# Patient Record
Sex: FEMALE | Race: WHITE | NOT HISPANIC OR LATINO | Employment: OTHER | URBAN - METROPOLITAN AREA
[De-identification: names, ages, dates, MRNs, and addresses within clinical notes are randomized per-mention and may not be internally consistent; named-entity substitution may affect disease eponyms.]

---

## 2017-03-23 ENCOUNTER — ALLSCRIPTS OFFICE VISIT (OUTPATIENT)
Dept: OTHER | Facility: OTHER | Age: 82
End: 2017-03-23

## 2017-05-15 ENCOUNTER — ALLSCRIPTS OFFICE VISIT (OUTPATIENT)
Dept: OTHER | Facility: OTHER | Age: 82
End: 2017-05-15

## 2017-08-14 ENCOUNTER — ALLSCRIPTS OFFICE VISIT (OUTPATIENT)
Dept: OTHER | Facility: OTHER | Age: 82
End: 2017-08-14

## 2017-08-14 PROCEDURE — 88305 TISSUE EXAM BY PATHOLOGIST: CPT | Performed by: OBSTETRICS & GYNECOLOGY

## 2017-08-14 PROCEDURE — 88344 IMHCHEM/IMCYTCHM EA MLT ANTB: CPT | Performed by: OBSTETRICS & GYNECOLOGY

## 2017-08-15 ENCOUNTER — LAB REQUISITION (OUTPATIENT)
Dept: LAB | Facility: HOSPITAL | Age: 82
End: 2017-08-15
Payer: MEDICARE

## 2017-08-15 DIAGNOSIS — N90.89 OTHER SPECIFIED NONINFLAMMATORY DISORDER OF VULVA AND PERINEUM: ICD-10-CM

## 2017-08-22 ENCOUNTER — GENERIC CONVERSION - ENCOUNTER (OUTPATIENT)
Dept: OTHER | Facility: OTHER | Age: 82
End: 2017-08-22

## 2017-08-29 ENCOUNTER — GENERIC CONVERSION - ENCOUNTER (OUTPATIENT)
Dept: OTHER | Facility: OTHER | Age: 82
End: 2017-08-29

## 2017-09-18 ENCOUNTER — ALLSCRIPTS OFFICE VISIT (OUTPATIENT)
Dept: OTHER | Facility: OTHER | Age: 82
End: 2017-09-18

## 2017-09-19 ENCOUNTER — GENERIC CONVERSION - ENCOUNTER (OUTPATIENT)
Dept: OTHER | Facility: OTHER | Age: 82
End: 2017-09-19

## 2017-09-22 RX ORDER — LEVOTHYROXINE SODIUM 0.05 MG/1
50 TABLET ORAL DAILY
COMMUNITY

## 2017-09-22 RX ORDER — DONEPEZIL HYDROCHLORIDE 5 MG/1
5 TABLET, FILM COATED ORAL
Status: ON HOLD | COMMUNITY
End: 2018-08-01 | Stop reason: ALTCHOICE

## 2017-09-22 RX ORDER — MEMANTINE HYDROCHLORIDE 10 MG/1
28 TABLET ORAL DAILY
Status: ON HOLD | COMMUNITY
End: 2018-08-01 | Stop reason: ALTCHOICE

## 2017-09-22 NOTE — PRE-PROCEDURE INSTRUCTIONS
Pre-Surgery Instructions:   Medication Instructions    donepezil (ARICEPT) 5 mg tablet Instructed patient per Anesthesia Guidelines   levothyroxine 50 mcg tablet Patient was instructed per "E-Preop"    memantine (NAMENDA) 10 mg tablet Patient was instructed per "E-Preop"      Medication Instruction (Hormone Replacement Therapy)    Please continue to take this medication on your normal schedule  If this is an oral medication and you take in the morning, you may do so with a sip of water  This medication may need to be discontinued four weeks before surgery if the surgical procedure is associated with a moderate to high risk of venous thromboembolism (e g  hip or knee replacement)  Please discuss with your surgeon to determine if this is required  Namenda XR 28 MG Oral Capsule Extended Release 24 Hour            NPO Instructions    Please do not eat or drink anything prior to your surgery as follows: For AM Surgery times = stop at midnight the night before  For PM Surgery times = Midnight to 8AM clear liquids only (Jello, broth, 7up, Sprite, apple juice, black coffee, black tea, Gatorade)  If you are supposed to take any of your medications, do so with a sip of water  Failure to follow these instructions can lead to an increased risk of lung complications and may result in a delay or cancellation of your procedure  If you have any questions, contact your institution for further instructions  Medical Procedure Risk        Medication Instruction (Thyroxine - Synthroid)    Please continue to take this medication on your normal schedule  If this is an oral medication and you take in the morning, you may do so with a sip of water           Levothyroxine Sodium 50 MCG Oral Tablet

## 2017-10-05 ENCOUNTER — ANESTHESIA EVENT (OUTPATIENT)
Dept: PERIOP | Facility: HOSPITAL | Age: 82
End: 2017-10-05
Payer: MEDICARE

## 2017-10-05 NOTE — ANESTHESIA PREPROCEDURE EVALUATION
Review of Systems/Medical History  Patient summary reviewed  Chart reviewed  History of anesthetic complications: No prior GAs  No family hx of anesthetic reactions  Cardiovascular  Exercise tolerance: good,  Hyperlipidemia,    Pulmonary  Smoker (Quit 2014) ex-smoker , COPD , ,        GI/Hepatic  Negative GI/hepatic ROS          Negative  ROS        Endo/Other  History of thyroid disease , hypothyroidism,      GYN      Comment: Vulvar neoplasm     Hematology   Musculoskeletal       Neurology      Comment: Severe dementia Psychology           Physical Exam    Airway    Mallampati score: II  TM Distance: >3 FB  Neck ROM: full     Dental   Comment: Upper partial plate,     Cardiovascular  Rhythm: regular, Rate: normal, Cardiovascular exam normal    Pulmonary  Pulmonary exam normal Breath sounds clear to auscultation,     Other Findings        Anesthesia Plan  ASA Score- 3       Anesthesia Type- IV sedation with anesthesia        Induction- intravenous      Informed Consent  Anesthetic plan and risks discussed with patient (Son)    NPO this AM   NKDA  Pt took her levothyroxine this AM     Plan: IV sedation/MAC, GA as backup    Risks and benefits discussed with pt and her son  Questions answered  Pt's son was consented due to pt's dementia

## 2017-10-06 ENCOUNTER — HOSPITAL ENCOUNTER (OUTPATIENT)
Facility: HOSPITAL | Age: 82
Setting detail: OUTPATIENT SURGERY
Discharge: HOME | End: 2017-10-06
Attending: OBSTETRICS & GYNECOLOGY | Admitting: OBSTETRICS & GYNECOLOGY
Payer: MEDICARE

## 2017-10-06 ENCOUNTER — ANESTHESIA (OUTPATIENT)
Dept: PERIOP | Facility: HOSPITAL | Age: 82
End: 2017-10-06
Payer: MEDICARE

## 2017-10-06 VITALS
TEMPERATURE: 96.9 F | SYSTOLIC BLOOD PRESSURE: 114 MMHG | RESPIRATION RATE: 161 BRPM | DIASTOLIC BLOOD PRESSURE: 60 MMHG | OXYGEN SATURATION: 94 % | BODY MASS INDEX: 24.19 KG/M2 | HEIGHT: 59 IN | WEIGHT: 120 LBS | HEART RATE: 72 BPM

## 2017-10-06 PROBLEM — D07.1 GRADE 3 VULVAR INTRAEPITHELIAL NEOPLASIA: Status: ACTIVE | Noted: 2017-10-06

## 2017-10-06 RX ORDER — ONDANSETRON 2 MG/ML
4 INJECTION INTRAMUSCULAR; INTRAVENOUS ONCE AS NEEDED
Status: DISCONTINUED | OUTPATIENT
Start: 2017-10-06 | End: 2017-10-06 | Stop reason: HOSPADM

## 2017-10-06 RX ORDER — DEXMEDETOMIDINE HYDROCHLORIDE 100 UG/ML
INJECTION, SOLUTION INTRAVENOUS AS NEEDED
Status: DISCONTINUED | OUTPATIENT
Start: 2017-10-06 | End: 2017-10-06 | Stop reason: SURG

## 2017-10-06 RX ORDER — EPHEDRINE SULFATE 50 MG/ML
INJECTION, SOLUTION INTRAVENOUS AS NEEDED
Status: DISCONTINUED | OUTPATIENT
Start: 2017-10-06 | End: 2017-10-06 | Stop reason: SURG

## 2017-10-06 RX ORDER — PROPOFOL 10 MG/ML
INJECTION, EMULSION INTRAVENOUS CONTINUOUS PRN
Status: DISCONTINUED | OUTPATIENT
Start: 2017-10-06 | End: 2017-10-06 | Stop reason: SURG

## 2017-10-06 RX ORDER — SODIUM CHLORIDE, SODIUM LACTATE, POTASSIUM CHLORIDE, CALCIUM CHLORIDE 600; 310; 30; 20 MG/100ML; MG/100ML; MG/100ML; MG/100ML
100 INJECTION, SOLUTION INTRAVENOUS CONTINUOUS
Status: DISCONTINUED | OUTPATIENT
Start: 2017-10-06 | End: 2017-10-06 | Stop reason: HOSPADM

## 2017-10-06 RX ORDER — ONDANSETRON 2 MG/ML
4 INJECTION INTRAMUSCULAR; INTRAVENOUS EVERY 6 HOURS PRN
Status: DISCONTINUED | OUTPATIENT
Start: 2017-10-06 | End: 2017-10-06 | Stop reason: HOSPADM

## 2017-10-06 RX ORDER — FENTANYL CITRATE 50 UG/ML
INJECTION, SOLUTION INTRAMUSCULAR; INTRAVENOUS AS NEEDED
Status: DISCONTINUED | OUTPATIENT
Start: 2017-10-06 | End: 2017-10-06 | Stop reason: SURG

## 2017-10-06 RX ORDER — OXYCODONE HYDROCHLORIDE AND ACETAMINOPHEN 5; 325 MG/1; MG/1
1 TABLET ORAL EVERY 4 HOURS PRN
Qty: 10 TABLET | Refills: 0 | Status: SHIPPED | OUTPATIENT
Start: 2017-10-06 | End: 2017-10-16

## 2017-10-06 RX ORDER — ACETAMINOPHEN 325 MG/1
650 TABLET ORAL EVERY 4 HOURS PRN
Status: DISCONTINUED | OUTPATIENT
Start: 2017-10-06 | End: 2017-10-06 | Stop reason: HOSPADM

## 2017-10-06 RX ORDER — ACETAMINOPHEN 325 MG/1
650 TABLET ORAL AS NEEDED
COMMUNITY
End: 2018-09-11

## 2017-10-06 RX ORDER — PROPOFOL 10 MG/ML
INJECTION, EMULSION INTRAVENOUS AS NEEDED
Status: DISCONTINUED | OUTPATIENT
Start: 2017-10-06 | End: 2017-10-06 | Stop reason: SURG

## 2017-10-06 RX ORDER — OXYCODONE HYDROCHLORIDE AND ACETAMINOPHEN 5; 325 MG/1; MG/1
1 TABLET ORAL EVERY 4 HOURS PRN
Status: DISCONTINUED | OUTPATIENT
Start: 2017-10-06 | End: 2017-10-06 | Stop reason: HOSPADM

## 2017-10-06 RX ORDER — FENTANYL CITRATE/PF 50 MCG/ML
25 SYRINGE (ML) INJECTION
Status: DISCONTINUED | OUTPATIENT
Start: 2017-10-06 | End: 2017-10-06 | Stop reason: HOSPADM

## 2017-10-06 RX ADMIN — DEXMEDETOMIDINE HYDROCHLORIDE 8 MCG: 100 INJECTION, SOLUTION INTRAVENOUS at 12:00

## 2017-10-06 RX ADMIN — EPHEDRINE SULFATE 10 MG: 50 INJECTION, SOLUTION INTRAMUSCULAR; INTRAVENOUS; SUBCUTANEOUS at 12:11

## 2017-10-06 RX ADMIN — PROPOFOL 100 MCG/KG/MIN: 10 INJECTION, EMULSION INTRAVENOUS at 12:00

## 2017-10-06 RX ADMIN — EPHEDRINE SULFATE 10 MG: 50 INJECTION, SOLUTION INTRAMUSCULAR; INTRAVENOUS; SUBCUTANEOUS at 12:22

## 2017-10-06 RX ADMIN — FENTANYL CITRATE 25 MCG: 50 INJECTION, SOLUTION INTRAMUSCULAR; INTRAVENOUS at 12:15

## 2017-10-06 RX ADMIN — PROPOFOL 80 MG: 10 INJECTION, EMULSION INTRAVENOUS at 12:00

## 2017-10-06 RX ADMIN — SODIUM CHLORIDE, SODIUM LACTATE, POTASSIUM CHLORIDE, AND CALCIUM CHLORIDE 100 ML/HR: .6; .31; .03; .02 INJECTION, SOLUTION INTRAVENOUS at 09:21

## 2017-10-06 RX ADMIN — CEFAZOLIN SODIUM 1000 MG: 1 SOLUTION INTRAVENOUS at 12:03

## 2017-10-06 RX ADMIN — FENTANYL CITRATE 25 MCG: 50 INJECTION, SOLUTION INTRAMUSCULAR; INTRAVENOUS at 12:07

## 2017-10-06 RX ADMIN — DEXMEDETOMIDINE HYDROCHLORIDE 4 MCG: 100 INJECTION, SOLUTION INTRAVENOUS at 12:15

## 2017-10-06 NOTE — ANESTHESIA POSTPROCEDURE EVALUATION
Post-Op Assessment Note      CV Status:  Stable    Mental Status:  Alert and lethargic    Hydration Status:  Euvolemic    PONV Controlled:  Controlled    Airway Patency:  Patent and adequate    Post Op Vitals Reviewed: Yes          Staff: CRNA       Comments: Airway reflexes intact  Oral airway removed in PACU    RN aware          /58    Temp 97 8 °F (36 6 °C) (10/06/17 1230)    Pulse  81   Resp 18 (10/06/17 1230)    SpO2 99 % (10/06/17 1230)

## 2017-10-06 NOTE — OP NOTE
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    Operative Findings:  10 mm x 10 mm left labia majora lesion     Complications:   None    Procedure and Technique:    Patient was taken to the operating room were a time out was performed to confirm correct patient and correct procedure  Intravenous sedation with anesthesia was administered and the patient was positioned on the operating room table in the dorsal lithotomy position  A demetrio hugger was placed to maintain control of core body temperature  The visualized lesion was demarcated with an ink pen  An Omni guide CO2 laser at 12 watt power setting was used to vaporize the lesion  Inspection of the vaporization site revealed no bleeding  Silvadene cream was applied to the vaporization site  The patient was awakened and moved to the recovery room bed and sent to the recovery room in satisfactory condition           Patient Disposition:  PACU     SIGNATURE: Dave Torres  DATE: 2017  TIME: 12:41 PM

## 2017-10-06 NOTE — DISCHARGE INSTRUCTIONS
Post-Gynecologic Surgery Discharge Instructions:  1  No heavy lifting more than one full gallon of milk (about 8 lbs) for 1 week  2  Nothing in the vagina for 6 weeks  3  You may take stairs one at a time, touching each step with both feet for the first few days, then as tolerated  4  Call the office for fever greater than 100  4'F, heavy vaginal bleeding, or increasing pain  5  Activity as tolerated  6  Avoid driving if taking narcotic pain medications (Percocet or Vicodin)  Post Operative Pain Management:  If you continue to have residual mild pain not entirely relieved then you may take 650 mg of tylenol every 6 hours  If you have any questions regarding your prescriptions please call your doctor

## 2017-10-19 ENCOUNTER — ALLSCRIPTS OFFICE VISIT (OUTPATIENT)
Dept: OTHER | Facility: OTHER | Age: 82
End: 2017-10-19

## 2017-10-20 NOTE — PROGRESS NOTES
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)    81year-old with history of BEE III     Plan  BEE III (vulvar intraepithelial neoplasia III)    · Follow-up PRN Evaluation and Treatment  Follow-up  Status: Complete  Done:  [id number]   Ordered; For: BEE III (vulvar intraepithelial neoplasia III); Ordered By: Rubina Mazariegos Performed:  Due: [id number]    The patient is doing well from a postoperative point of view  The patient can follow up on a p r n  basis  Chief Complaint  Patient has severe dementia and does not know why she is here  She is here for postop visit  Post-Op  Problem [facility]:   1) BEE IIIuterine prolapse requiring pessarysevere dementia  Previous Therapy:   1) 8/18/2017 vulvar biopsies showing BEE III10/6/2017: Omni guide CO2 laser vaporization of BEE III       Free Text HPI: 81year-old status post laser vaporization for BEE III  Patient comes in today for a postoperative visit with minimal complaints  Active Problems  1  Abnormality of gait due to impairment of balance (781 2) (R26 89)   2  Acute cognitive decline (799 52) (R41 841)   3  Adult hypothyroidism (244 9) (E03 9)   4  Age related osteoporosis (733 01) (M81 0)   5  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)   6  Chronic obstructive pulmonary disease (COPD) (496) (J44 9)   7  Complete uterovaginal prolapse (618 3) (N81 3)   8  Dementia without behavioral disturbance (294 20) (F03 90)   9  Hyperlipidemia (272 4) (E78 5)   10  Labial lesion (624 8) (N90 89)   11  Memory loss (780 93) (R41 3)   12  Osteopenia (733 90) (M85 80)   13  Other specified hypothyroidism (244 8) (E03 8)   14  Pain (780 96) (R52)   15  Pulmonary emphysema, unspecified emphysema type (492 8) (J43 9)   16  Symbolic dysfunction (079 09) (R48 9)   17  BEE III (vulvar intraepithelial neoplasia III) (233 32) (D07 1)   18   Vulvar lesion (624 8) (N90 89)    Social History   · Always uses seat belt   · Caffeine use (V49 89) (F15 90) · Never a smoker   · No alcohol use   · One child   ·     Current Meds   1  Donepezil HCl - 5 MG Oral Tablet; Therapy: 54YOC2967 to Recorded   2  Levothyroxine Sodium 50 MCG Oral Tablet; Therapy: 45UBA2799 to Recorded   3  Namenda XR 28 MG Oral Capsule Extended Release 24 Hour; Therapy: 08QMH0411 to Recorded   4  Tylenol 325 MG Oral Tablet; Therapy: 45XOH6708 to Recorded    Allergies  1  No Known Drug Allergies    Vitals   Recorded: 19Oct2017 11:01AM   Temperature 98 F   Heart Rate 64, L Radial   Pulse Quality Normal, L Radial   Respiration Quality Normal   Respiration 16   Systolic 275, LUE, Sitting   Diastolic 70, LUE, Sitting   Height 4 ft 11 in   Weight 115 lb    BMI Calculated 23 23   BSA Calculated 1 46     Physical Exam    Constitutional   General appearance: No acute distress, well appearing and well nourished  Genitourinary   External genitalia: Normal and no lesions appreciated  -- Area of laser vaporization is healing well  Chest   Breasts: Normal and no dimpling or skin changes noted  Abdomen   Abdomen: Normal, non-tender, and no organomegaly noted  Liver and spleen: No hepatomegaly or splenomegaly  Examination for hernias: No hernias appreciated         Future Appointments    Date/Time Provider Specialty Site   11/13/2017 09:45 AM Hakeem Motta DO Obstetrics/Gynecology [facility]   Electronically signed by : Fermin Mercedes MD; Oct 19 2017 11:08AM EST                       (Author)

## 2017-11-13 ENCOUNTER — ALLSCRIPTS OFFICE VISIT (OUTPATIENT)
Dept: OTHER | Facility: OTHER | Age: 82
End: 2017-11-13

## 2017-11-14 NOTE — PROGRESS NOTES
Assessment    1  Complete uterovaginal prolapse (618 3) (N81 3)    Discussion/Summary  Discussion Summary:   Uterine prolapse-removed 2 3/4 gelhorn and inserted a 2 3/4 shaatz for ease of maintenance  She was given the Gellhorn to take with her in case we need to use it in the future  in 3 months for pessary check  Goals and Barriers: The patient has the current Goals: See discussion  The patent has the current Barriers: Dementia  Patient's Capacity to Self-Care: Patient is unable to Self-Care:      Chief Complaint  Chief Complaint Free Text Note Form: PT HERE FOR PESSARY CK      History of Present Illness  HPI: Patient presents for pessary check  She is here with a caregiver from her nursing home  She has severe dementia  She has a Gellhorn pessary  She just had laser treatment for BEE 3  She has no complaints  Review of Systems  Focused-Female:  Constitutional: No fever, no chills, feels well, no tiredness, no recent weight gain or loss  ENT: no ear ache, no loss of hearing, no nosebleeds or nasal discharge, no sore throat or hoarseness  Cardiovascular: no complaints of slow or fast heart rate, no chest pain, no palpitations, no leg claudication or lower extremity edema  Respiratory: no complaints of shortness of breath, no wheezing, no dyspnea on exertion, no orthopnea or PND  Breasts: no complaints of breast pain, breast lump or nipple discharge  Gastrointestinal: no complaints of abdominal pain, no constipation, no nausea or diarrhea, no vomiting, no bloody stools  Genitourinary: no complaints of dysuria, no incontinence, no pelvic pain, no dysmenorrhea, no vaginal discharge or abnormal vaginal bleeding  Musculoskeletal: no complaints of arthralgia, no myalgia, no joint swelling or stiffness, no limb pain or swelling  Integumentary: no complaints of skin rash or lesion, no itching or dry skin, no skin wounds    Neurological: no complaints of headache, no confusion, no numbness or tingling, no dizziness or fainting  ROS Reviewed:   ROS reviewed  Active Problems  1  Abnormality of gait due to impairment of balance (781 2) (R26 89)   2  Acute cognitive decline (799 52) (R41 841)   3  Adult hypothyroidism (244 9) (E03 9)   4  Age related osteoporosis (733 01) (M81 0)   5  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)   6  Chronic obstructive pulmonary disease (COPD) (496) (J44 9)   7  Complete uterovaginal prolapse (618 3) (N81 3)   8  Dementia without behavioral disturbance (294 20) (F03 90)   9  Hyperlipidemia (272 4) (E78 5)   10  Memory loss (780 93) (R41 3)   11  Osteopenia (733 90) (M85 80)   12  Other specified hypothyroidism (244 8) (E03 8)   13  Pain (780 96) (R52)   14  Pulmonary emphysema, unspecified emphysema type (492 8) (J43 9)   15  Symbolic dysfunction (241 40) (R48 9)    Past Medical History  1  History of  1   2  History of Labial lesion (624 8) (N90 89)   3  History of Postmenopausal bleeding (627 1) (N95 0)   4  History of BEE III (vulvar intraepithelial neoplasia III) (233 32) (D07 1)   5  History of Vulvar lesion (624 8) (N90 89)  Active Problems And Past Medical History Reviewed: The active problems and past medical history were reviewed and updated today  Surgical History  1  Denied: History Of Prior Surgery  Surgical History Reviewed: The surgical history was reviewed and updated today  Family History  Mother    1  No pertinent family history  Father    2  No pertinent family history  Family History Reviewed: The family history was reviewed and updated today  Social History     · Always uses seat belt   · Caffeine use (V49 89) (F15 90)   · Never a smoker   · No alcohol use   · One child   ·   Social History Reviewed: The social history was reviewed and updated today  Current Meds   1  Donepezil HCl - 5 MG Oral Tablet; Therapy: 44YEQ3480 to Recorded   2  Levothyroxine Sodium 50 MCG Oral Tablet; Therapy: 46JSR7288 to Recorded   3   Namenda XR 28 MG Oral Capsule Extended Release 24 Hour; Therapy: 32QWT6486 to Recorded   4  Tylenol 325 MG Oral Tablet; Therapy: 18GLU1859 to Recorded  Medication List Reviewed: The medication list was reviewed and updated today  Allergies  1  No Known Drug Allergies    Vitals  Vital Signs    Recorded: 31HUF1195 55:59DI   Systolic 469, LUE, Sitting   Diastolic 74, LUE, Sitting   Height 4 ft 11 in   Weight 119 lb 6 oz   BMI Calculated 24 11   BSA Calculated 1 48   LMP POSTMENOPAUSAL       Physical Exam   Genitourinary  External genitalia: Normal and no lesions appreciated  Vagina: Normal, no lesions or dryness appreciated  -- Gellhorn pessary removed with minimal difficulty, proved very difficult to reinsert  A same size shaatz pessary, 2-3/4, was placed easily  There was no vaginal excoriation noted  Urethra: Normal    Urethral meatus: Normal    Bladder: Normal, soft, non-tender and no prolapse or masses appreciated  Cervix: Normal, no palpable masses  Uterus: Normal, non-tender, not enlarged, and no palpable masses  Adnexa/parametria: Normal, non-tender and no fullness or masses appreciated  Signatures   Electronically signed by :  Eneida Berger DO; Nov 13 2017 12:49PM EST                       (Author)

## 2018-01-09 NOTE — MISCELLANEOUS
Message   Recorded as Task   Date: 08/22/2017 02:15 PM, Created By: Jose Antonio Mustafa   Task Name: Follow Up   Assigned To: Irene Le   Regarding Patient: Diana Martinez, Status: Active   Comment:    Jose Antonio Mustafa - 22 Aug 2017 2:15 PM     TASK CREATED  I spoke with Winter Rivero's son  [place] had a vulvar bx last week and has BEE 3  She needs to see gyn-onc  She has dementia and lives at Son  Discussed different possibilities of treatment, she would probable be better with a least invasive treatment  I told him it would be good for him to go with her to the visit as there will be decisions to make and [place] is unable to make them  SHe will check with Son to see who we contact to set up a visit and then get back to us with the information  pleasae let me know what is finally set up and call me with any questions        Signatures   Electronically signed by :  Fredis Richardson DO; Aug 22 2017  2:16PM EST                       (Author)

## 2018-01-12 VITALS
HEIGHT: 60 IN | WEIGHT: 126.5 LBS | DIASTOLIC BLOOD PRESSURE: 84 MMHG | BODY MASS INDEX: 24.84 KG/M2 | SYSTOLIC BLOOD PRESSURE: 128 MMHG

## 2018-01-12 VITALS
TEMPERATURE: 97.8 F | HEIGHT: 59 IN | BODY MASS INDEX: 24.29 KG/M2 | DIASTOLIC BLOOD PRESSURE: 58 MMHG | SYSTOLIC BLOOD PRESSURE: 106 MMHG | WEIGHT: 120.5 LBS | RESPIRATION RATE: 14 BRPM | HEART RATE: 68 BPM

## 2018-01-12 VITALS
DIASTOLIC BLOOD PRESSURE: 74 MMHG | HEIGHT: 59 IN | WEIGHT: 119.38 LBS | SYSTOLIC BLOOD PRESSURE: 112 MMHG | BODY MASS INDEX: 24.07 KG/M2

## 2018-01-14 VITALS
BODY MASS INDEX: 23.58 KG/M2 | DIASTOLIC BLOOD PRESSURE: 84 MMHG | SYSTOLIC BLOOD PRESSURE: 118 MMHG | WEIGHT: 120.13 LBS | HEIGHT: 60 IN

## 2018-01-14 VITALS
WEIGHT: 115 LBS | RESPIRATION RATE: 16 BRPM | TEMPERATURE: 98 F | DIASTOLIC BLOOD PRESSURE: 70 MMHG | HEIGHT: 59 IN | SYSTOLIC BLOOD PRESSURE: 110 MMHG | HEART RATE: 64 BPM | BODY MASS INDEX: 23.18 KG/M2

## 2018-01-14 VITALS
BODY MASS INDEX: 24.84 KG/M2 | DIASTOLIC BLOOD PRESSURE: 78 MMHG | SYSTOLIC BLOOD PRESSURE: 124 MMHG | WEIGHT: 126.5 LBS | HEIGHT: 60 IN

## 2018-01-17 NOTE — MISCELLANEOUS
Message   Recorded as Task   Date: 08/22/2017 02:15 PM, Created By: Shaila Villasenor   Task Name: Follow Up   Assigned To: [facility]   Regarding Patient: Kerri Dobbins, Status: In Progress   Comment:    Shaila Villasenor - 22 Aug 2017 2:15 PM     TASK CREATED  I spoke with Shirlene March, Winter's son  [place] Pa had a vulvar bx last week and has BEE 3  She needs to see gyn-onc  She has dementia and lives at Son  Discussed different possibilities of treatment, she would probable be better with a least invasive treatment  I told him it would be good for him to go with her to the visit as there will be decisions to make and [place] Pa is unable to make them  SHe will check with Son to see who we contact to set up a visit and then get back to us with the information  josefina let me know what is finally set up and call me with any questions   Harman Bird - 22 Aug 2017 2:28 PM     TASK IN PROGRESS   Harman Bird - 24 Aug 2017 11:43 AM     TASK REPLIED TO: Previously Assigned To [address] [place] [address] with patient's son, Ryann Garcias  Informed him to expect a call from [facility]  Called [facility] to give patient information  They called patient's son and scheduled appointment on 9/18/17 @ 1300 with Dr Jasper Mojica, the new MD there  Ryann Garcias will accompany patient to the appointment  Shaila Villasenor - 28 Aug 2017 8:15 AM     TASK REPLIED TO: Previously Assigned To Shaila Villasenor     thank you        Active Problems    1  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)   2  Complete uterovaginal prolapse (618 3) (N81 3)   3  Labial lesion (624 8) (N90 89)   4  Memory loss (780 93) (R41 3)   5  Osteopenia (733 90) (M85 80)   6  Other specified hypothyroidism (244 8) (E03 8)   7  Pulmonary emphysema, unspecified emphysema type (492 8) (J43 9)   8  Vulvar lesion (624 8) (N90 89)    Current Meds   1  Donepezil HCl - 5 MG Oral Tablet; Therapy: 76WMO7207 to Recorded   2  Levothyroxine Sodium 50 MCG Oral Tablet;    Therapy: 70PTL5888 to Recorded   3  Milk of Magnesia CONC; Therapy: (Recorded:84Cxl0604) to Recorded   4  Namenda XR 28 MG Oral Capsule Extended Release 24 Hour; Therapy: 21RCG1738 to Recorded   5  Tylenol 325 MG Oral Tablet; Therapy: 97GFZ0711 to Recorded    Allergies    1   No Known Drug Allergies    Signatures   Electronically signed by : Andi Craft, ; Aug 29 2017  8:46AM EST                       (Author)

## 2018-01-18 NOTE — MISCELLANEOUS
Message   Date: 14 Jan 2016 12:48 PM EST, Recorded By: Anitha Ashby For: Mahendra Nicole   Reason: Medical Complaint   Fred from [facility] in  called for new Pt  Referred by PCP for uterine prolapse  Pt needs appt in  office  Pt will arrive by transport but is ambulatory          Signatures   Electronically signed by : Rhianna Davies, ; Jan 14 2016 12:51PM EST                       (Author)

## 2018-02-27 ENCOUNTER — OFFICE VISIT (OUTPATIENT)
Dept: OBGYN CLINIC | Facility: CLINIC | Age: 83
End: 2018-02-27
Payer: MEDICARE

## 2018-02-27 VITALS
WEIGHT: 118.8 LBS | SYSTOLIC BLOOD PRESSURE: 122 MMHG | BODY MASS INDEX: 22.43 KG/M2 | DIASTOLIC BLOOD PRESSURE: 70 MMHG | HEIGHT: 61 IN

## 2018-02-27 DIAGNOSIS — N81.4 UTEROVAGINAL PROLAPSE: Primary | ICD-10-CM

## 2018-02-27 PROCEDURE — 99213 OFFICE O/P EST LOW 20 MIN: CPT | Performed by: OBSTETRICS & GYNECOLOGY

## 2018-02-27 RX ORDER — ACETAMINOPHEN 325 MG/1
325 TABLET ORAL AS NEEDED
COMMUNITY
Start: 2016-02-19

## 2018-02-27 RX ORDER — MEMANTINE HYDROCHLORIDE 28 MG/1
28 CAPSULE, EXTENDED RELEASE ORAL DAILY
COMMUNITY
Start: 2016-02-19

## 2018-02-27 RX ORDER — DONEPEZIL HYDROCHLORIDE 5 MG/1
5 TABLET, FILM COATED ORAL DAILY
COMMUNITY
Start: 2016-02-19

## 2018-02-27 NOTE — PROGRESS NOTES
Assessment/Plan:     Utero vaginal prolapse-she will return in 3 months for a pessary check, at that time we will consider switching her to a 2-1/2 or 2-1/4 Shaatz pessary  There are no diagnoses linked to this encounter  Subjective:     Patient ID: Lionel Shin is a 81 y o  female  Patient presents with her caregiver for a pessary check  She has no complaints  She has severe dementia and is not a reliable historian  At her last visit we switched her from a Gellhorn to a Linda Sas because it was very uncomfortable removing the Gellhorn  She has a 2-3/4 shaatz pessary now  Review of Systems   All other systems reviewed and are negative  Objective:     Physical Exam   Genitourinary: Vagina normal and uterus normal    Genitourinary Comments: Shaatz removed, cleaned and reinserted with mild difficulty  Vagina is not excoriated from the pessary    Bimanual exam is normal with no adnexal masses and normal sized uterus

## 2018-05-22 ENCOUNTER — OFFICE VISIT (OUTPATIENT)
Dept: OBGYN CLINIC | Facility: CLINIC | Age: 83
End: 2018-05-22
Payer: MEDICARE

## 2018-05-22 VITALS
HEIGHT: 59 IN | WEIGHT: 115.8 LBS | SYSTOLIC BLOOD PRESSURE: 124 MMHG | BODY MASS INDEX: 23.35 KG/M2 | DIASTOLIC BLOOD PRESSURE: 76 MMHG

## 2018-05-22 DIAGNOSIS — N76.0 BACTERIAL VAGINOSIS: ICD-10-CM

## 2018-05-22 DIAGNOSIS — N81.4 UTERINE PROLAPSE: ICD-10-CM

## 2018-05-22 DIAGNOSIS — N90.89 VULVAR LESION: Primary | ICD-10-CM

## 2018-05-22 DIAGNOSIS — B96.89 BACTERIAL VAGINOSIS: ICD-10-CM

## 2018-05-22 DIAGNOSIS — N89.8 VAGINAL DISCHARGE: ICD-10-CM

## 2018-05-22 PROCEDURE — 87210 SMEAR WET MOUNT SALINE/INK: CPT | Performed by: OBSTETRICS & GYNECOLOGY

## 2018-05-22 PROCEDURE — 87186 SC STD MICRODIL/AGAR DIL: CPT | Performed by: OBSTETRICS & GYNECOLOGY

## 2018-05-22 PROCEDURE — 87205 SMEAR GRAM STAIN: CPT | Performed by: OBSTETRICS & GYNECOLOGY

## 2018-05-22 PROCEDURE — 87077 CULTURE AEROBIC IDENTIFY: CPT | Performed by: OBSTETRICS & GYNECOLOGY

## 2018-05-22 PROCEDURE — 87070 CULTURE OTHR SPECIMN AEROBIC: CPT | Performed by: OBSTETRICS & GYNECOLOGY

## 2018-05-22 PROCEDURE — 87147 CULTURE TYPE IMMUNOLOGIC: CPT | Performed by: OBSTETRICS & GYNECOLOGY

## 2018-05-22 PROCEDURE — 99213 OFFICE O/P EST LOW 20 MIN: CPT | Performed by: OBSTETRICS & GYNECOLOGY

## 2018-05-22 NOTE — PROGRESS NOTES
Assessment/Plan:     Bacterial vaginosis-prescription for metronidazole gel written on patient's order she for the nursing home  Left labial lesion-this was cultured, this is in the same area as her BEE 3 and laser treatment  Will await culture but she may need to follow up with gyn on for this  If necessary, we can repeat biopsy the area  It does not have a similar appearance to the BEE 3 lesion that she had  Uterine prolapse-stable, she is tolerating the pessary well, she will return in 3 months for a pessary check or sooner as needed     There are no diagnoses linked to this encounter  Subjective:     Patient ID: Edel Rosa is a 81 y o  female  Patient here with an aide for a pessary check  There is a note from the nursing home stating that the patient has had vaginal discharge and itching and a possible pustule on on her left labia  The patient has severe  dementia and is not aware of these symptoms and has no complaints  She is cooperative  Review of Systems   Genitourinary: Positive for vaginal discharge  Objective:     Physical Exam   Genitourinary: Vagina normal and uterus normal  There is lesion on the left labia  Uterus is not deviated, not enlarged, not fixed and not tender  Cervix exhibits discharge  Cervix exhibits no motion tenderness and no friability  Right adnexum displays no mass, no tenderness and no fullness  Left adnexum displays no mass, no tenderness and no fullness     Genitourinary Comments: shaatz removed, cleaned and reinserted without difficulty, no excoriation    Inner left labia at 4 o'clock reveals a few tiny white raised lesions, healing ulcerative area, culture taken

## 2018-05-24 LAB
BACTERIA WND AEROBE CULT: ABNORMAL
GRAM STN SPEC: ABNORMAL

## 2018-05-25 ENCOUNTER — TELEPHONE (OUTPATIENT)
Dept: OBGYN CLINIC | Facility: CLINIC | Age: 83
End: 2018-05-25

## 2018-05-25 NOTE — TELEPHONE ENCOUNTER
----- Message from Daly Simmons DO sent at 5/24/2018  3:42 PM EDT -----  Patient's left labial lesion culture grew out E coli and strep  This is susceptible to cephalosporins  Would treat with Keflex 500 mg b i d  for 1 week  If this resolves, I can recheck her at her pessary check in 3 months, if this does not resolve, she must be seen sooner  She does have a history of vulvar dysplasia that was treated with laser last year and I want to be sure this resolves  I did also diagnosed her with BV earlier this week and she can start the Keflex right away since it is p  o  and the Metrogel is vaginal   She lives in a nursing home and this information will need to be called there  We should have the information in her chart but if you have a problem, let me know    Thank you

## 2018-06-04 ENCOUNTER — DOCUMENTATION (OUTPATIENT)
Dept: OBGYN CLINIC | Facility: CLINIC | Age: 83
End: 2018-06-04

## 2018-06-04 NOTE — PROGRESS NOTES
I was called by a nurse at [facility] where patient resides  I saw Codie Castano 2 weeks ago and she had an ulcerative lesion on her labia along with 2 white lesions  The ulcerative lesion was cultured and grew out E coli  This was treated  This has resolved but the 2 white lesions are there and the patient is stating that they hurt her and she is continually touching them  She has severe dementia  Last year, the patient had severe vulvar dysplasia in this area that was treated but  when she was 1st diagnosed, it had the same appearance as the 2 white lesions  Since the infection seems to have resolved, I would recommend that she go back to see gyn Oncology, Dr Hortencia Sapp  The nurse will make an appointment for her

## 2018-06-06 ENCOUNTER — TELEPHONE (OUTPATIENT)
Dept: GYNECOLOGIC ONCOLOGY | Facility: CLINIC | Age: 83
End: 2018-06-06

## 2018-06-06 NOTE — TELEPHONE ENCOUNTER
[facility] cancelled [facility] appointment for tomorrow due to transportation issues  I made them aware Dr Hortencia Sapp will be away for 2 weeks  Asked if they wanted to see another doctor due to documented discomfort and they said no, 2 weeks is fine

## 2018-06-25 ENCOUNTER — OFFICE VISIT (OUTPATIENT)
Dept: GYNECOLOGIC ONCOLOGY | Facility: CLINIC | Age: 83
End: 2018-06-25
Payer: MEDICARE

## 2018-06-25 VITALS
RESPIRATION RATE: 14 BRPM | DIASTOLIC BLOOD PRESSURE: 62 MMHG | BODY MASS INDEX: 23.39 KG/M2 | TEMPERATURE: 98.3 F | WEIGHT: 116 LBS | HEART RATE: 70 BPM | SYSTOLIC BLOOD PRESSURE: 112 MMHG | HEIGHT: 59 IN

## 2018-06-25 DIAGNOSIS — D07.1 VIN III (VULVAR INTRAEPITHELIAL NEOPLASIA III): Primary | ICD-10-CM

## 2018-06-25 PROCEDURE — 88305 TISSUE EXAM BY PATHOLOGIST: CPT | Performed by: PATHOLOGY

## 2018-06-25 PROCEDURE — 56605 BIOPSY OF VULVA/PERINEUM: CPT | Performed by: OBSTETRICS & GYNECOLOGY

## 2018-06-25 PROCEDURE — 99214 OFFICE O/P EST MOD 30 MIN: CPT | Performed by: OBSTETRICS & GYNECOLOGY

## 2018-06-25 NOTE — PROGRESS NOTES
Pedro Buchanan  1935  [facility]  [facility] Alvah William  [address] [id number] [phone]    Chief Complaint   Patient presents with    Follow-up     History of BEE III      Previous Therapy: 10/6/2017:  Omni guide laser vaporization  of the vulva for BEE 3     History of Present Illness: The patient is a delightful 81year-old with a history of BEE 3   I last saw and treated the patient in October of 2017  Patient underwent an Omni guide laser vaporization of the left labia on October 6, 2017  Area was on the left labia and measured 10 x 10 mm  Patient recently saw Dr Rebecca Grant and had complaints of lesions in the same area  Patient comes in today for evaluation and treatment  Review of Systems   Constitutional: Negative for activity change, appetite change, chills, diaphoresis, fatigue, fever and unexpected weight change  HENT: Negative for congestion, dental problem, drooling, ear discharge, ear pain, facial swelling, hearing loss, mouth sores, nosebleeds, postnasal drip, rhinorrhea, sinus pain, sinus pressure, sneezing, sore throat, tinnitus, trouble swallowing and voice change  Eyes: Negative for photophobia, pain, discharge, redness, itching and visual disturbance  Respiratory: Negative for apnea, cough, choking, chest tightness, shortness of breath, wheezing and stridor  Cardiovascular: Negative for chest pain, palpitations and leg swelling  Gastrointestinal: Negative for abdominal distention, abdominal pain, anal bleeding, blood in stool, constipation, diarrhea, nausea, rectal pain and vomiting  Endocrine: Negative for cold intolerance, heat intolerance, polydipsia, polyphagia and polyuria     Genitourinary: Negative for decreased urine volume, difficulty urinating, dyspareunia, dysuria, enuresis, flank pain, frequency, genital sores, hematuria, menstrual problem, pelvic pain, urgency, vaginal bleeding, vaginal discharge and vaginal pain  Musculoskeletal: Negative for arthralgias, back pain, gait problem, joint swelling, myalgias, neck pain and neck stiffness  Skin: Negative for color change, pallor, rash and wound  Allergic/Immunologic: Negative for environmental allergies, food allergies and immunocompromised state  Neurological: Negative for dizziness, tremors, seizures, syncope, facial asymmetry, speech difficulty, weakness, light-headedness, numbness and headaches  Hematological: Negative for adenopathy  Does not bruise/bleed easily  Psychiatric/Behavioral: Negative for agitation, behavioral problems, confusion, decreased concentration, dysphoric mood, hallucinations, self-injury, sleep disturbance and suicidal ideas  The patient is not nervous/anxious and is not hyperactive  Patient Active Problem List   Diagnosis    BEE III (vulvar intraepithelial neoplasia III)     Social History     Social History    Marital status:      Spouse name: N/A    Number of children: 1    Years of education: N/A     Occupational History    Not on file       Social History Main Topics    Smoking status: Never Smoker    Smokeless tobacco: Never Used    Alcohol use No    Drug use: No    Sexual activity: Not on file     Other Topics Concern    Not on file     Social History Narrative    Always uses seat belt    Caffeine use         Past Medical History:   Diagnosis Date    Cognitive communication deficit     COPD (chronic obstructive pulmonary disease) ([facility] [address]     Dementia      1 para 1     Hyperlipidemia     Hypothyroidism     Labial lesion     Last assessed 17    Osteoporosis     Other abnormalities of gait and mobility     Postmenopausal bleeding     16    BEE III (vulvar intraepithelial neoplasia III)     Last assessed 10/19/17    Vulvar lesion     Resolved 17       Current Outpatient Prescriptions:     acetaminophen (TYLENOL) 325 mg tablet, Take 650 mg by mouth as needed for mild pain, Disp: , Rfl:     acetaminophen (TYLENOL) 325 mg tablet, Take 325 mg by mouth as needed, Disp: , Rfl:     donepezil (ARICEPT) 5 mg tablet, Take 5 mg by mouth daily at bedtime, Disp: , Rfl:     donepezil (ARICEPT) 5 mg tablet, Take 5 mg by mouth daily, Disp: , Rfl:     levothyroxine 50 mcg tablet, Take 50 mcg by mouth daily, Disp: , Rfl:     memantine (NAMENDA) 10 mg tablet, Take 28 mg by mouth daily  , Disp: , Rfl:     Memantine HCl ER (NAMENDA XR) 28 MG CP24, Take 28 mg by mouth daily, Disp: , Rfl:     OXYCODONE-ASPIRIN PO, Take 1 tablet by mouth every 6 (six) hours as needed, Disp: , Rfl:     silver sulfadiazine (SILVADENE,SSD) 1 % cream, Apply topically 2 (two) times a day Apply 1/16" once or twice daily; reapply as needed to areas where the cream is removed by patient activity; the burned area should be covered with cream at all times  Continue until healed  , Disp: 50 g, Rfl: 0  No Known Allergies  Vitals:    06/25/18 1531   BP: 112/62   Pulse: 70   Resp: 14   Temp: 98 3 °F (36 8 °C)       Labs:  CMP  No results found for: NA, K, CL, CO2, ANIONGAP, BUN, CREATININE, GLUCOSE, GLUF, CALCIUM, CORRECTEDCA, AST, ALT, ALKPHOS, PROT, ALBUMIN, BILITOT, EGFR    BMP  No results found for: GLUCOSE, CALCIUM, NA, K, CO2, CL, BUN, CREATININE    Lipids  No results found for: CHOL  No results found for: HDL  No results found for: LDLCALC  No results found for: TRIG  No components found for: CHOLHDL    Hemoglobin A1C  No results found for: HGBA1C    Fasting Glucose  No results found for: GLUF    Insulin     Thyroid  No results found for: TSH, H2OYRIY, B1WITGC, THYROIDAB    Hepatic Function Panel  No results found for: ALT, AST, GGT, ALKPHOS, BILITOT    Celiac Disease Antibody Panel  No results found for: ENDOMYSIAL IGA, GLIADIN IGA, GLIADIN IGG, IGA, TISSUE TRANSGLUT AB, TTG IGA   Iron  No results found for: IRON, TIBC, FERRITIN    Other Labs:         Physical Exam   Constitutional: She is oriented to person, place, and time  She appears well-developed and well-nourished  No distress  HENT:   Head: Normocephalic and atraumatic  Right Ear: External ear normal    Left Ear: External ear normal    Nose: Nose normal    Mouth/Throat: No oropharyngeal exudate  Eyes: Pupils are equal, round, and reactive to light  Right eye exhibits no discharge  Left eye exhibits no discharge  No scleral icterus  Neck: Normal range of motion  Neck supple  No JVD present  No tracheal deviation present  No thyromegaly present  Cardiovascular: Normal rate, regular rhythm, normal heart sounds and intact distal pulses  Exam reveals no gallop and no friction rub  No murmur heard  Pulmonary/Chest: Effort normal and breath sounds normal  No stridor  No respiratory distress  She has no wheezes  She has no rales  She exhibits no tenderness  Abdominal: Soft  Bowel sounds are normal  She exhibits no distension and no mass  There is no tenderness  There is no rebound and no guarding  Genitourinary: Vagina normal and uterus normal          Genitourinary Comments: The external female genitalia is normal  The bartholin's, uretheral and skenes glands are normal  The urethral meatus is normal  Speculum exam reveals a grossly normal vagina with age-appropriate atrophy  No masses, lesions or bleeding  Bimanual exam notes a normal uterus with no adnexal masses  Pessary in place  Musculoskeletal: Normal range of motion  She exhibits no edema, tenderness or deformity  Lymphadenopathy:     She has no cervical adenopathy  Neurological: She is alert and oriented to person, place, and time  She has normal reflexes  No cranial nerve deficit  She exhibits normal muscle tone  Coordination normal    Skin: Skin is warm and dry  No rash noted  She is not diaphoretic  No erythema  No pallor  Psychiatric: She has a normal mood and affect   Her behavior is normal  Judgment and thought content normal      Procedures   Vulvar biopsy performed with 3 mm punch biopsy  Patient tolerated the procedure well  1% lidocaine was used to numb the area prior to performing the biopsy  Silver nitrate was applied for hemostasis  Assessment  history of BEE 3 with vulvar biopsy performed today  Plan  patient will follow up in 2 weeks for results  Patient may need another laser vaporization if the results return as BEE 3     Blayne Rainey MD, PhD, Apolinar Barroso  Attending Physician, [address] [place] [address]

## 2018-07-17 ENCOUNTER — OFFICE VISIT (OUTPATIENT)
Dept: GYNECOLOGIC ONCOLOGY | Facility: CLINIC | Age: 83
End: 2018-07-17
Payer: MEDICARE

## 2018-07-17 ENCOUNTER — TELEPHONE (OUTPATIENT)
Dept: PREADMISSION TESTING | Facility: HOSPITAL | Age: 83
End: 2018-07-17

## 2018-07-17 VITALS
RESPIRATION RATE: 12 BRPM | DIASTOLIC BLOOD PRESSURE: 68 MMHG | HEIGHT: 59 IN | TEMPERATURE: 98.5 F | WEIGHT: 115.6 LBS | SYSTOLIC BLOOD PRESSURE: 120 MMHG | HEART RATE: 76 BPM | BODY MASS INDEX: 23.31 KG/M2

## 2018-07-17 DIAGNOSIS — D07.1 VIN III (VULVAR INTRAEPITHELIAL NEOPLASIA III): Primary | ICD-10-CM

## 2018-07-17 PROCEDURE — 99213 OFFICE O/P EST LOW 20 MIN: CPT | Performed by: OBSTETRICS & GYNECOLOGY

## 2018-07-17 NOTE — PROGRESS NOTES
Anibal Fleming  1935  [facility]  [facility] [address] [id number] [phone]    Chief Complaint   Patient presents with    Follow-up     Schedule surgery    Cancer Staging  No matching staging information was found for the patient  Previous Therapy:  CO2 laser vaporization 10/6/2017  History of Present Illness: The patient is a delightful 46-year-old with a history of BEE 3   I recently saw patient for suspicion of recurrence and I performed a biopsy of the left labia on 2008  Results from that biopsy showed recurrent BEE 3  Patient comes in today for results and surgical scheduling  Review of Systems    Patient Active Problem List   Diagnosis    BEE III (vulvar intraepithelial neoplasia III)     Social History     Social History    Marital status:      Spouse name: N/A    Number of children: 1    Years of education: N/A     Occupational History    Not on file       Social History Main Topics    Smoking status: Never Smoker    Smokeless tobacco: Never Used    Alcohol use No    Drug use: No    Sexual activity: Not on file     Other Topics Concern    Not on file     Social History Narrative    Always uses seat belt    Caffeine use         Past Medical History:   Diagnosis Date    Cognitive communication deficit     COPD (chronic obstructive pulmonary disease) ([facility] [address]     Dementia      1 para 1     Hyperlipidemia     Hypothyroidism     Labial lesion     Last assessed 17    Osteoporosis     Other abnormalities of gait and mobility     Postmenopausal bleeding     16    BEE III (vulvar intraepithelial neoplasia III)     Last assessed 10/19/17    Vulvar lesion     Resolved 17       Current Outpatient Prescriptions:     acetaminophen (TYLENOL) 325 mg tablet, Take 650 mg by mouth as needed for mild pain, Disp: , Rfl:     acetaminophen (TYLENOL) 325 mg tablet, Take 325 mg by mouth as needed, Disp: , Rfl:     donepezil (ARICEPT) 5 mg tablet, Take 5 mg by mouth daily at bedtime, Disp: , Rfl:     donepezil (ARICEPT) 5 mg tablet, Take 5 mg by mouth daily, Disp: , Rfl:     levothyroxine 50 mcg tablet, Take 50 mcg by mouth daily, Disp: , Rfl:     memantine (NAMENDA) 10 mg tablet, Take 28 mg by mouth daily  , Disp: , Rfl:     Memantine HCl ER (NAMENDA XR) 28 MG CP24, Take 28 mg by mouth daily, Disp: , Rfl:     OXYCODONE-ASPIRIN PO, Take 1 tablet by mouth every 6 (six) hours as needed, Disp: , Rfl:     silver sulfadiazine (SILVADENE,SSD) 1 % cream, Apply topically 2 (two) times a day Apply 1/16" once or twice daily; reapply as needed to areas where the cream is removed by patient activity; the burned area should be covered with cream at all times  Continue until healed  , Disp: 50 g, Rfl: 0  No Known Allergies  Vitals:    07/17/18 1014   BP: 120/68   Pulse: 76   Resp: 12   Temp: 98 5 °F (36 9 °C)       Labs:  CMP  No results found for: NA, K, CL, CO2, ANIONGAP, BUN, CREATININE, GLUCOSE, GLUF, CALCIUM, CORRECTEDCA, AST, ALT, ALKPHOS, PROT, ALBUMIN, BILITOT, EGFR    BMP  No results found for: GLUCOSE, CALCIUM, NA, K, CO2, CL, BUN, CREATININE    Lipids  No results found for: CHOL  No results found for: HDL  No results found for: LDLCALC  No results found for: TRIG  No components found for: CHOLHDL    Hemoglobin A1C  No results found for: HGBA1C    Fasting Glucose  No results found for: GLUF    Insulin     Thyroid  No results found for: TSH, B9OHEIG, B1LDDMK, THYROIDAB    Hepatic Function Panel  No results found for: ALT, AST, GGT, ALKPHOS, BILITOT    Celiac Disease Antibody Panel  No results found for: ENDOMYSIAL IGA, GLIADIN IGA, GLIADIN IGG, IGA, TISSUE TRANSGLUT AB, TTG IGA   Iron  No results found for: IRON, TIBC, FERRITIN    Other Labs:    6/25/2018: Final Diagnosis   A   Labia, left biopsy:  - High-grade squamous intraepithelial lesion/vulvar intraepithelial neoplasia grade 3 of usual type (HSIL/uVIN 3/carcinoma in-situ), present at peripheral borders/base of both biopsy fragments    - No invasive carcinoma identified in small portion of submucosa/dermis present  Physical Exam   Constitutional: She is oriented to person, place, and time  She appears well-developed and well-nourished  No distress  HENT:   Head: Normocephalic and atraumatic  Right Ear: External ear normal    Left Ear: External ear normal    Nose: Nose normal    Mouth/Throat: No oropharyngeal exudate  Eyes: Pupils are equal, round, and reactive to light  Right eye exhibits no discharge  Left eye exhibits no discharge  No scleral icterus  Neck: Normal range of motion  Neck supple  No JVD present  No tracheal deviation present  No thyromegaly present  Cardiovascular: Normal rate, regular rhythm, normal heart sounds and intact distal pulses  Exam reveals no gallop and no friction rub  No murmur heard  Pulmonary/Chest: Effort normal and breath sounds normal  No stridor  No respiratory distress  She has no wheezes  She has no rales  She exhibits no tenderness  Abdominal: Soft  Bowel sounds are normal  She exhibits no distension and no mass  There is no tenderness  There is no rebound and no guarding  Musculoskeletal: Normal range of motion  She exhibits no edema, tenderness or deformity  Lymphadenopathy:     She has no cervical adenopathy  Neurological: She is alert and oriented to person, place, and time  She has normal reflexes  No cranial nerve deficit  She exhibits normal muscle tone  Coordination normal    Skin: Skin is warm and dry  No rash noted  She is not diaphoretic  No erythema  No pallor  Psychiatric: She has a normal mood and affect  Her behavior is normal  Judgment and thought content normal        Assessment      Recurrent vulvar intraepithelial neoplasia 3    Plan      Proceed with CO2 laser vaporization of the vulva  Patient signed informed consent today    Patient understands the risks and wishes to proceed  Blayne Montanez MD, PhD, Tylor Booker  Attending Physician, [address] [facility]

## 2018-08-01 ENCOUNTER — HOSPITAL ENCOUNTER (OUTPATIENT)
Facility: HOSPITAL | Age: 83
Setting detail: OUTPATIENT SURGERY
Discharge: HOME | End: 2018-08-01
Attending: OBSTETRICS & GYNECOLOGY | Admitting: OBSTETRICS & GYNECOLOGY
Payer: MEDICARE

## 2018-08-01 ENCOUNTER — ANESTHESIA (OUTPATIENT)
Dept: PERIOP | Facility: HOSPITAL | Age: 83
End: 2018-08-01
Payer: MEDICARE

## 2018-08-01 ENCOUNTER — ANESTHESIA EVENT (OUTPATIENT)
Dept: PERIOP | Facility: HOSPITAL | Age: 83
End: 2018-08-01
Payer: MEDICARE

## 2018-08-01 VITALS
RESPIRATION RATE: 18 BRPM | HEIGHT: 61 IN | BODY MASS INDEX: 21.71 KG/M2 | HEART RATE: 73 BPM | TEMPERATURE: 96.7 F | OXYGEN SATURATION: 96 % | WEIGHT: 115 LBS | SYSTOLIC BLOOD PRESSURE: 124 MMHG | DIASTOLIC BLOOD PRESSURE: 69 MMHG

## 2018-08-01 DIAGNOSIS — D07.1 VIN III (VULVAR INTRAEPITHELIAL NEOPLASIA III): Primary | ICD-10-CM

## 2018-08-01 PROCEDURE — 56501 DESTROY VULVA LESIONS SIM: CPT | Performed by: OBSTETRICS & GYNECOLOGY

## 2018-08-01 RX ORDER — OXYCODONE HYDROCHLORIDE AND ACETAMINOPHEN 5; 325 MG/1; MG/1
1 TABLET ORAL EVERY 6 HOURS PRN
Status: DISCONTINUED | OUTPATIENT
Start: 2018-08-01 | End: 2018-08-01 | Stop reason: HOSPADM

## 2018-08-01 RX ORDER — ONDANSETRON 2 MG/ML
4 INJECTION INTRAMUSCULAR; INTRAVENOUS EVERY 6 HOURS PRN
Status: DISCONTINUED | OUTPATIENT
Start: 2018-08-01 | End: 2018-08-01 | Stop reason: HOSPADM

## 2018-08-01 RX ORDER — EPHEDRINE SULFATE 50 MG/ML
INJECTION, SOLUTION INTRAVENOUS AS NEEDED
Status: DISCONTINUED | OUTPATIENT
Start: 2018-08-01 | End: 2018-08-01 | Stop reason: SURG

## 2018-08-01 RX ORDER — DEXAMETHASONE SODIUM PHOSPHATE 4 MG/ML
INJECTION, SOLUTION INTRA-ARTICULAR; INTRALESIONAL; INTRAMUSCULAR; INTRAVENOUS; SOFT TISSUE AS NEEDED
Status: DISCONTINUED | OUTPATIENT
Start: 2018-08-01 | End: 2018-08-01 | Stop reason: SURG

## 2018-08-01 RX ORDER — FENTANYL CITRATE 50 UG/ML
INJECTION, SOLUTION INTRAMUSCULAR; INTRAVENOUS AS NEEDED
Status: DISCONTINUED | OUTPATIENT
Start: 2018-08-01 | End: 2018-08-01 | Stop reason: SURG

## 2018-08-01 RX ORDER — FENTANYL CITRATE/PF 50 MCG/ML
25 SYRINGE (ML) INJECTION
Status: DISCONTINUED | OUTPATIENT
Start: 2018-08-01 | End: 2018-08-01 | Stop reason: HOSPADM

## 2018-08-01 RX ORDER — ACETAMINOPHEN 325 MG/1
650 TABLET ORAL EVERY 6 HOURS PRN
Status: DISCONTINUED | OUTPATIENT
Start: 2018-08-01 | End: 2018-08-01 | Stop reason: HOSPADM

## 2018-08-01 RX ORDER — IBUPROFEN 400 MG/1
400 TABLET ORAL EVERY 6 HOURS PRN
Status: DISCONTINUED | OUTPATIENT
Start: 2018-08-01 | End: 2018-08-01 | Stop reason: HOSPADM

## 2018-08-01 RX ORDER — PROPOFOL 10 MG/ML
INJECTION, EMULSION INTRAVENOUS AS NEEDED
Status: DISCONTINUED | OUTPATIENT
Start: 2018-08-01 | End: 2018-08-01 | Stop reason: SURG

## 2018-08-01 RX ORDER — ONDANSETRON 2 MG/ML
4 INJECTION INTRAMUSCULAR; INTRAVENOUS ONCE AS NEEDED
Status: DISCONTINUED | OUTPATIENT
Start: 2018-08-01 | End: 2018-08-01 | Stop reason: HOSPADM

## 2018-08-01 RX ORDER — SODIUM CHLORIDE 9 MG/ML
100 INJECTION, SOLUTION INTRAVENOUS CONTINUOUS
Status: DISCONTINUED | OUTPATIENT
Start: 2018-08-01 | End: 2018-08-01 | Stop reason: HOSPADM

## 2018-08-01 RX ORDER — ONDANSETRON 2 MG/ML
INJECTION INTRAMUSCULAR; INTRAVENOUS AS NEEDED
Status: DISCONTINUED | OUTPATIENT
Start: 2018-08-01 | End: 2018-08-01 | Stop reason: SURG

## 2018-08-01 RX ORDER — SODIUM CHLORIDE, SODIUM LACTATE, POTASSIUM CHLORIDE, CALCIUM CHLORIDE 600; 310; 30; 20 MG/100ML; MG/100ML; MG/100ML; MG/100ML
20 INJECTION, SOLUTION INTRAVENOUS CONTINUOUS
Status: DISCONTINUED | OUTPATIENT
Start: 2018-08-01 | End: 2018-08-01 | Stop reason: HOSPADM

## 2018-08-01 RX ORDER — OXYCODONE HYDROCHLORIDE AND ACETAMINOPHEN 5; 325 MG/1; MG/1
1 TABLET ORAL EVERY 4 HOURS PRN
Qty: 20 TABLET | Refills: 0 | Status: SHIPPED | OUTPATIENT
Start: 2018-08-01 | End: 2018-08-11

## 2018-08-01 RX ADMIN — SODIUM CHLORIDE, SODIUM LACTATE, POTASSIUM CHLORIDE, AND CALCIUM CHLORIDE 20 ML/HR: .6; .31; .03; .02 INJECTION, SOLUTION INTRAVENOUS at 10:53

## 2018-08-01 RX ADMIN — FENTANYL CITRATE 50 MCG: 50 INJECTION, SOLUTION INTRAMUSCULAR; INTRAVENOUS at 12:32

## 2018-08-01 RX ADMIN — EPHEDRINE SULFATE 5 MG: 50 INJECTION, SOLUTION INTRAMUSCULAR; INTRAVENOUS; SUBCUTANEOUS at 12:48

## 2018-08-01 RX ADMIN — EPHEDRINE SULFATE 5 MG: 50 INJECTION, SOLUTION INTRAMUSCULAR; INTRAVENOUS; SUBCUTANEOUS at 12:33

## 2018-08-01 RX ADMIN — CEFAZOLIN SODIUM 1000 MG: 1 SOLUTION INTRAVENOUS at 12:34

## 2018-08-01 RX ADMIN — ONDANSETRON 4 MG: 2 INJECTION INTRAMUSCULAR; INTRAVENOUS at 12:36

## 2018-08-01 RX ADMIN — DEXAMETHASONE SODIUM PHOSPHATE 4 MG: 4 INJECTION, SOLUTION INTRAMUSCULAR; INTRAVENOUS at 12:36

## 2018-08-01 RX ADMIN — EPHEDRINE SULFATE 10 MG: 50 INJECTION, SOLUTION INTRAMUSCULAR; INTRAVENOUS; SUBCUTANEOUS at 12:38

## 2018-08-01 RX ADMIN — PROPOFOL 150 MG: 10 INJECTION, EMULSION INTRAVENOUS at 12:32

## 2018-08-01 NOTE — H&P (VIEW-ONLY)
Jillian Lua  1935  [facility]  [facility] [place] Tony  [address] [address] [phone]    Chief Complaint   Patient presents with    Follow-up     Schedule surgery    Cancer Staging  No matching staging information was found for the patient  Previous Therapy:  CO2 laser vaporization 10/6/2017  History of Present Illness: The patient is a delightful 81-year-old with a history of BEE 3   I recently saw patient for suspicion of recurrence and I performed a biopsy of the left labia on 2008  Results from that biopsy showed recurrent BEE 3  Patient comes in today for results and surgical scheduling  Review of Systems    Patient Active Problem List   Diagnosis    BEE III (vulvar intraepithelial neoplasia III)     Social History     Social History    Marital status:      Spouse name: N/A    Number of children: 1    Years of education: N/A     Occupational History    Not on file       Social History Main Topics    Smoking status: Never Smoker    Smokeless tobacco: Never Used    Alcohol use No    Drug use: No    Sexual activity: Not on file     Other Topics Concern    Not on file     Social History Narrative    Always uses seat belt    Caffeine use         Past Medical History:   Diagnosis Date    Cognitive communication deficit     COPD (chronic obstructive pulmonary disease) ([facility] [address]     Dementia      1 para 1     Hyperlipidemia     Hypothyroidism     Labial lesion     Last assessed 17    Osteoporosis     Other abnormalities of gait and mobility     Postmenopausal bleeding     16    BEE III (vulvar intraepithelial neoplasia III)     Last assessed 10/19/17    Vulvar lesion     Resolved 17       Current Outpatient Prescriptions:     acetaminophen (TYLENOL) 325 mg tablet, Take 650 mg by mouth as needed for mild pain, Disp: , Rfl:     acetaminophen (TYLENOL) 325 mg tablet, Take 325 mg by mouth as needed, Disp: , Rfl:     donepezil (ARICEPT) 5 mg tablet, Take 5 mg by mouth daily at bedtime, Disp: , Rfl:     donepezil (ARICEPT) 5 mg tablet, Take 5 mg by mouth daily, Disp: , Rfl:     levothyroxine 50 mcg tablet, Take 50 mcg by mouth daily, Disp: , Rfl:     memantine (NAMENDA) 10 mg tablet, Take 28 mg by mouth daily  , Disp: , Rfl:     Memantine HCl ER (NAMENDA XR) 28 MG CP24, Take 28 mg by mouth daily, Disp: , Rfl:     OXYCODONE-ASPIRIN PO, Take 1 tablet by mouth every 6 (six) hours as needed, Disp: , Rfl:     silver sulfadiazine (SILVADENE,SSD) 1 % cream, Apply topically 2 (two) times a day Apply 1/16" once or twice daily; reapply as needed to areas where the cream is removed by patient activity; the burned area should be covered with cream at all times  Continue until healed  , Disp: 50 g, Rfl: 0  No Known Allergies  Vitals:    07/17/18 1014   BP: 120/68   Pulse: 76   Resp: 12   Temp: 98 5 °F (36 9 °C)       Labs:  CMP  No results found for: NA, K, CL, CO2, ANIONGAP, BUN, CREATININE, GLUCOSE, GLUF, CALCIUM, CORRECTEDCA, AST, ALT, ALKPHOS, PROT, ALBUMIN, BILITOT, EGFR    BMP  No results found for: GLUCOSE, CALCIUM, NA, K, CO2, CL, BUN, CREATININE    Lipids  No results found for: CHOL  No results found for: HDL  No results found for: LDLCALC  No results found for: TRIG  No components found for: CHOLHDL    Hemoglobin A1C  No results found for: HGBA1C    Fasting Glucose  No results found for: GLUF    Insulin     Thyroid  No results found for: TSH, [id number], [id number], THYROIDAB    Hepatic Function Panel  No results found for: ALT, AST, GGT, ALKPHOS, BILITOT    Celiac Disease Antibody Panel  No results found for: ENDOMYSIAL IGA, GLIADIN IGA, GLIADIN IGG, IGA, TISSUE TRANSGLUT AB, TTG IGA   Iron  No results found for: IRON, TIBC, FERRITIN    Other Labs:    6/25/2018: Final Diagnosis   A   Labia, left biopsy:  - High-grade squamous intraepithelial lesion/vulvar intraepithelial neoplasia grade 3 of usual type (HSIL/uVIN 3/carcinoma in-situ), present at peripheral borders/base of both biopsy fragments    - No invasive carcinoma identified in small portion of submucosa/dermis present  Physical Exam   Constitutional: She is oriented to person, place, and time  She appears well-developed and well-nourished  No distress  HENT:   Head: Normocephalic and atraumatic  Right Ear: External ear normal    Left Ear: External ear normal    Nose: Nose normal    Mouth/Throat: No oropharyngeal exudate  Eyes: Pupils are equal, round, and reactive to light  Right eye exhibits no discharge  Left eye exhibits no discharge  No scleral icterus  Neck: Normal range of motion  Neck supple  No JVD present  No tracheal deviation present  No thyromegaly present  Cardiovascular: Normal rate, regular rhythm, normal heart sounds and intact distal pulses  Exam reveals no gallop and no friction rub  No murmur heard  Pulmonary/Chest: Effort normal and breath sounds normal  No stridor  No respiratory distress  She has no wheezes  She has no rales  She exhibits no tenderness  Abdominal: Soft  Bowel sounds are normal  She exhibits no distension and no mass  There is no tenderness  There is no rebound and no guarding  Musculoskeletal: Normal range of motion  She exhibits no edema, tenderness or deformity  Lymphadenopathy:     She has no cervical adenopathy  Neurological: She is alert and oriented to person, place, and time  She has normal reflexes  No cranial nerve deficit  She exhibits normal muscle tone  Coordination normal    Skin: Skin is warm and dry  No rash noted  She is not diaphoretic  No erythema  No pallor  Psychiatric: She has a normal mood and affect  Her behavior is normal  Judgment and thought content normal        Assessment      Recurrent vulvar intraepithelial neoplasia 3    Plan      Proceed with CO2 laser vaporization of the vulva  Patient signed informed consent today    Patient understands the risks and wishes to proceed  Blayne Yu MD, PhD, Martina Singh  Attending Physician, [address] [place] [address]

## 2018-08-01 NOTE — DISCHARGE INSTRUCTIONS
Laser ablation of Skin Lesion   AMBULATORY CARE:   What you need to know about laser ablation of a skin lesion:  Ablation of a skin lesion is surgery to surgically burn a piece of abnormal skin tissue  The skin tissue may be malignant (skin cancer) or it may be benign  Benign means the skin tissue does not have cancer cells and cannot spread  How to prepare for ablation of a skin lesion:  Your healthcare provider will talk to you about how to prepare for surgery  He may tell you not to eat or drink anything after midnight on the day of your surgery  He will tell you what medicines to take or not take on the day of your surgery  You may be given an antibiotic through your IV to help prevent a bacterial infection  What will happen during ablation of a skin lesion:  Your healthcare provider will ramón the area of your skin that will be removed  He will then ablate on the marked area  He will surgically burn the outer layer of your skin  He may also remove deeper layers of tissue underneath your skin  He may use heat to stop any bleeding  He may cover your ablation site with a silvadene  What will happen after ablation of a skin lesion:  Apply silvadine cream 1 - 2 times daily as needed  Risks of excision of a skin lesion:  You may bleed more than expected or get an infection  You may lose feeling, or you may feel tingling or prickling in the surgery area  Your scar may not look the way you expected  It may also limit your movement or affect your expressions if you had surgery on your face  Contact your healthcare provider if:   · You have pain in your ablation site that does not get better with medicine  · You have a fever or chills  · Your wound is red, swollen, bleeding, or draining pus  · You have questions or concerns about your condition or care  Care for your wound as directed: You may take a shower 24 hours after  your surgery   Do not  soak in water (bathtub, hot tub, swimming pool) until 2 weeks following surgery  Check your wound for signs of infection such as redness, swelling, or pus drainage  You may shower and allow warm soapy water to run over surgery site, do not scrub directly  Apply silvadene cream  One to Two times daily as needed      Follow up with your healthcare provider as directed:   Write down your questions so you remember to ask them during your visits  © 2017 [employer] Information is for End User's use only and may not be sold, redistributed or otherwise used for commercial purposes  All illustrations and images included in CareNotes® are the copyrighted property of A D [employer] M , [employer]  or Rashawn Mcgowan  The above information is an  only  It is not intended as medical advice for individual conditions or treatments  Talk to your doctor, nurse or pharmacist before following any medical regimen to see if it is safe and effective for you  HPV (Human Papillomavirus)   WHAT YOU NEED TO KNOW:   What is human papillomavirus (HPV)? HPV is the most common infection spread by sexual contact  It can also be spread from a mother to her baby during delivery  HPV may cause oral and genital warts or tumors in your nose, mouth, throat, and lungs  HPV may also cause vaginal, penile, and anal cancers  You may not show symptoms of any of these conditions for several years after being exposed to HPV  What are the symptoms of HPV? · Painless warts    · Genital or anal discharge, bleeding, itching, or pain    · Pain when you urinate  How is HPV diagnosed and treated? Your healthcare provider may use a vinegar liquid to help diagnose HPV genital warts  He or she may take tissue samples to test for HPV infection  There is no cure for HPV  There is treatment for the conditions that are caused by HPV  You will need to be closely monitored for these conditions   Ask your healthcare provider for more information about monitoring, conditions caused by HPV, and treatments  How can HPV infection be prevented? Vaccinations can help stop the spread of HPV  The vaccine is most effective if given before sexual activity begins  This allows your body to build almost complete protection against HPV before having contact with the virus  The HPV vaccine is still effective up to the age of 32 if it is given after sexual activity has already begun  Who should get the HPV vaccine? The first dose of the vaccine may be given as early as 5years of age  The following should also get the vaccine:  · All females and males 6to 15years of age    · Females 15 through 32years of age, and males 15 through 24years of age, who have not been vaccinated     · Men up to 32years of age who have sex with other men, and have not been vaccinated     · Anyone with a weak immune system, including infection with HIV, up to 32years of age  Who should not get the vaccine or should wait to get it? Do not get a second dose if you had a severe allergic reaction to the first dose  Do not get the vaccine if you are pregnant  If you are sick, wait to get the vaccine until symptoms go away  How is the vaccine given? The HPV vaccine can be given with other vaccinations  The vaccine is given in 3 doses to adults:  · The first dose  is given at any time  · The second dose  is given 1 to 2 months after the first dose  · The third dose  is given 6 months after the first dose  What else do I need to know about how the vaccine is given? You may need 1 more dose of the HPV vaccine if any of the following is true:  · You only received 1 dose of the HPV vaccine before 13years of age    · You received 2 doses of the HPV vaccine less than 5 months apart before 13years of age  When should I contact my healthcare provider? · You have warts in your genital or anal area  · You have genital or anal discharge, bleeding, itching, or pain  · You have pain when you urinate      · You have questions or concerns about your condition or care  CARE AGREEMENT:   You have the right to help plan your care  Learn about your health condition and how it may be treated  Discuss treatment options with your caregivers to decide what care you want to receive  You always have the right to refuse treatment  The above information is an  only  It is not intended as medical advice for individual conditions or treatments  Talk to your doctor, nurse or pharmacist before following any medical regimen to see if it is safe and effective for you  © 2017 [employer] Information is for End User's use only and may not be sold, redistributed or otherwise used for commercial purposes  All illustrations and images included in CareNotes® are the copyrighted property of [employer]  or Rashawn Mcgowan

## 2018-08-01 NOTE — ANESTHESIA PREPROCEDURE EVALUATION
Review of Systems/Medical History  Patient summary reviewed  Chart reviewed  No history of anesthetic complications     Cardiovascular  EKG reviewed, Exercise tolerance (METS): >4,  Hyperlipidemia,   Comment: clearance reviewed  EKG: NSR  Patient lives in nursing home  Does walk around and climb stairs  No positive h/o of any chest pain, SOB, MI in past   ,  Pulmonary  COPD ,        GI/Hepatic            Endo/Other  History of thyroid disease , hypothyroidism,      GYN      Comment: BEE III (vulvar intraepithelial neoplasia III)     Hematology   Musculoskeletal       Neurology   Psychology           Physical Exam    Airway    Mallampati score: II  TM Distance: >3 FB  Neck ROM: full     Dental   No notable dental hx upper dentures,     Cardiovascular  Cardiovascular exam normal    Pulmonary  Pulmonary exam normal     Other Findings        Anesthesia Plan  ASA Score- 3     Anesthesia Type- IV sedation with anesthesia with ASA Monitors  Additional Monitors:   Airway Plan:         Plan Factors-    Induction- intravenous  Postoperative Plan-     Informed Consent- Anesthetic plan and risks discussed with patient

## 2018-08-01 NOTE — INTERVAL H&P NOTE
H&P reviewed  After examining the patient I find no changes in the patients condition since the H&P had been written  The patient has been medically cleared for surgery  Blayne Barron MD, PhD, Jennifer Armstrong  Attending Physician, [address] [place] [address]

## 2018-08-01 NOTE — ANESTHESIA POSTPROCEDURE EVALUATION
Post-Op Assessment Note      CV Status:  Stable    Mental Status:  Alert and awake    Hydration Status:  Euvolemic    PONV Controlled:  Controlled    Airway Patency:  Patent    Post Op Vitals Reviewed: Yes          Staff: Anesthesiologist           BP (P) 120/65 (08/01/18 1310)    Temp (!) (P) 97 1 °F (36 2 °C) (08/01/18 1310)    Pulse (P) 94 (08/01/18 1310)   Resp (P) 18 (08/01/18 1310)    SpO2

## 2018-08-01 NOTE — OP NOTE
OPERATIVE REPORT  PATIENT NAME: Edgardo Nunez    :  1935  MRN: [id number]  Pt Location: [facility] OR ROOM 15    SURGERY DATE: 2018    Surgeon(s) and Role:     * Griselda Medici, MD - Assisting     * Jaycee Sow MD - Ivett Patient, MD - Primary    Preop Diagnosis:  BEE III (vulvar intraepithelial neoplasia III) [D07 1]    Post-Op Diagnosis Codes:     * BEE III (vulvar intraepithelial neoplasia III) [D07 1]    Procedure(s) (LRB):  EUA, CO2 LASER VAPORIZATION OF VULVA (N/A)  EXAM UNDER ANESTHESIA (EUA) (N/A)    Specimen(s): none    Estimated Blood Loss:   Minimal    Drains: none     Anesthesia Type:   LMA    Operative Indications:  BEE III (vulvar intraepithelial neoplasia III) [D07 1]    Operative Findings:  Vulvar condyloma suggestive of VINIII,    - patch of flesh colored lesions located at left vulvar border at 3 o'clock position, approx 2cm diameter  Age appropriate atrophy of external genitalia    Complications:   None    Procedure and Technique:    Procedure and Technique:  After informed consent was obtained and via power of , the patient was taken the operating room where general anesthesia was administered via LMA  She was then prepped and draped in normal sterile fashion in the dorsal lithotomy position  Exam under anesthesia revealed the above-mentioned findings  The CO2 laser was set at 15 w continuous and the patch of visible vulvar lesions were ablated  No additional condyloma were identified  Hemostasis was noted  Silvadene cream was then applied  She was then awakened and transferred to the recovery room stable condition  All instrument counts correct x2 for procedure  No complications  Dr Ariane Tinoco was present and participated in all essential parts of the procedure      Patient Disposition:  PACU     SIGNATURE: Griselda Medici, MD  DATE: 2018  TIME: 1:15 PM

## 2018-08-20 ENCOUNTER — OFFICE VISIT (OUTPATIENT)
Dept: GYNECOLOGIC ONCOLOGY | Facility: CLINIC | Age: 83
End: 2018-08-20

## 2018-08-20 VITALS
DIASTOLIC BLOOD PRESSURE: 64 MMHG | SYSTOLIC BLOOD PRESSURE: 118 MMHG | HEIGHT: 61 IN | RESPIRATION RATE: 14 BRPM | HEART RATE: 66 BPM | WEIGHT: 115 LBS | BODY MASS INDEX: 21.71 KG/M2

## 2018-08-20 DIAGNOSIS — D07.1 VIN III (VULVAR INTRAEPITHELIAL NEOPLASIA III): Primary | ICD-10-CM

## 2018-08-20 PROCEDURE — 99024 POSTOP FOLLOW-UP VISIT: CPT | Performed by: OBSTETRICS & GYNECOLOGY

## 2018-08-20 NOTE — PROGRESS NOTES
Jillian Lua  1935  [facility]  [facility] [place]  [address] [address] [phone]    Chief Complaint   Patient presents with    Post-op      Previous Therapy: 2018 Laser vaporization of the vulva  History of Present Illness: The patient is a delightful 81year-old with a history of recurrent BEE 3  Most recently, on 2018 the patient underwent laser vaporization of the vulva  Patient comes in today for her postoperative visit with no complaints  Review of Systems    Patient Active Problem List   Diagnosis    BEE III (vulvar intraepithelial neoplasia III)     Social History     Social History    Marital status:      Spouse name: N/A    Number of children: 1    Years of education: N/A     Occupational History    Not on file       Social History Main Topics    Smoking status: Never Smoker    Smokeless tobacco: Never Used    Alcohol use No    Drug use: No    Sexual activity: Not on file     Other Topics Concern    Not on file     Social History Narrative    Always uses seat belt    Caffeine use         Past Medical History:   Diagnosis Date    Cognitive communication deficit     COPD (chronic obstructive pulmonary disease) ([facility] [address] )     Dementia      1 para 1     Hyperlipidemia     Hypothyroidism     Labial lesion     Last assessed 17    Osteoporosis     Other abnormalities of gait and mobility     Postmenopausal bleeding     16    BEE III (vulvar intraepithelial neoplasia III)     Last assessed 10/19/17    Vulvar lesion     Resolved 17       Current Outpatient Prescriptions:     acetaminophen (TYLENOL) 325 mg tablet, Take 650 mg by mouth as needed for mild pain, Disp: , Rfl:     acetaminophen (TYLENOL) 325 mg tablet, Take 325 mg by mouth as needed, Disp: , Rfl:     donepezil (ARICEPT) 5 mg tablet, Take 5 mg by mouth daily, Disp: , Rfl:     levothyroxine 50 mcg tablet, Take 50 mcg by mouth daily, Disp: , Rfl:     Memantine HCl ER (NAMENDA XR) 28 MG CP24, Take 28 mg by mouth daily, Disp: , Rfl:     OXYCODONE-ASPIRIN PO, Take 1 tablet by mouth every 6 (six) hours as needed, Disp: , Rfl:     silver sulfadiazine (SILVADENE,SSD) 1 % cream, Apply topically daily, Disp: 50 g, Rfl: 0  No Known Allergies  There were no vitals filed for this visit  Labs:  CMP  No results found for: NA, K, CL, CO2, ANIONGAP, BUN, CREATININE, GLUCOSE, GLUF, CALCIUM, CORRECTEDCA, AST, ALT, ALKPHOS, PROT, ALBUMIN, BILITOT, EGFR    BMP  No results found for: GLUCOSE, CALCIUM, NA, K, CO2, CL, BUN, CREATININE    Lipids  No results found for: CHOL  No results found for: HDL  No results found for: LDLCALC  No results found for: TRIG  No components found for: CHOLHDL    Hemoglobin A1C  No results found for: HGBA1C    Fasting Glucose  No results found for: GLUF    Insulin     Thyroid  No results found for: TSH, B3IEMDZ, S5PRAZI, THYROIDAB    Hepatic Function Panel  No results found for: ALT, AST, GGT, ALKPHOS, BILITOT    Celiac Disease Antibody Panel  No results found for: ENDOMYSIAL IGA, GLIADIN IGA, GLIADIN IGG, IGA, TISSUE TRANSGLUT AB, TTG IGA   Iron  No results found for: IRON, TIBC, FERRITIN    Other Labs:         Physical Exam   Constitutional: She is oriented to person, place, and time  She appears well-developed and well-nourished  No distress  HENT:   Head: Normocephalic and atraumatic  Right Ear: External ear normal    Left Ear: External ear normal    Nose: Nose normal    Mouth/Throat: No oropharyngeal exudate  Eyes: Pupils are equal, round, and reactive to light  Right eye exhibits no discharge  Left eye exhibits no discharge  No scleral icterus  Neck: Normal range of motion  Neck supple  No JVD present  No tracheal deviation present  No thyromegaly present  Cardiovascular: Normal rate, regular rhythm, normal heart sounds and intact distal pulses  Exam reveals no gallop and no friction rub      No murmur heard  Pulmonary/Chest: Effort normal and breath sounds normal  No stridor  No respiratory distress  She has no wheezes  She has no rales  She exhibits no tenderness  Abdominal: Soft  Bowel sounds are normal  She exhibits no distension and no mass  There is no tenderness  There is no rebound and no guarding  Genitourinary: Vagina normal and uterus normal    Genitourinary Comments: The external female genitalia is normal  The bartholin's, uretheral and skenes glands are normal  The urethral meatus is normal  Speculum exam reveals a grossly normal vagina with age-appropriate atrophy  No masses, lesions or bleeding  Bimanual exam notes a normal uterus with no adnexal masses  Musculoskeletal: Normal range of motion  She exhibits no edema, tenderness or deformity  Lymphadenopathy:     She has no cervical adenopathy  Neurological: She is alert and oriented to person, place, and time  She has normal reflexes  No cranial nerve deficit  She exhibits normal muscle tone  Coordination normal    Skin: Skin is warm and dry  No rash noted  She is not diaphoretic  No erythema  No pallor  Psychiatric: She has a normal mood and affect  Her behavior is normal  Judgment and thought content normal        Assessment      Status post laser vaporization for BEE III    Plan      No further gynecological oncology follow-up warranted  Blayne Broussard MD, PhD, Ed Willis  Attending Physician, [facility]

## 2018-09-11 ENCOUNTER — OFFICE VISIT (OUTPATIENT)
Dept: OBGYN CLINIC | Facility: CLINIC | Age: 83
End: 2018-09-11
Payer: MEDICARE

## 2018-09-11 VITALS
HEIGHT: 61 IN | WEIGHT: 114.2 LBS | SYSTOLIC BLOOD PRESSURE: 112 MMHG | BODY MASS INDEX: 21.56 KG/M2 | DIASTOLIC BLOOD PRESSURE: 58 MMHG

## 2018-09-11 DIAGNOSIS — N81.4 UTERINE PROLAPSE: Primary | ICD-10-CM

## 2018-09-11 PROCEDURE — 99213 OFFICE O/P EST LOW 20 MIN: CPT | Performed by: OBSTETRICS & GYNECOLOGY

## 2018-09-11 NOTE — PROGRESS NOTES
Assessment/Plan:     Uterine prolapse-return in 3-4 months for pessary check  If this continues to be uncomfortable, can consider a slightly smaller shaatz pessary     There are no diagnoses linked to this encounter  Subjective:     Patient ID: Molina Sow is a 81 y o  female  Patient here for pessary check  She has severe dementia but does not have any complaints  She had a recurrence of BEE 3 that was treated last month with laser by Gynecologic Oncology  Review of Systems   Constitutional: Negative  HENT: Negative  Eyes: Negative  Respiratory: Negative  Cardiovascular: Negative  Gastrointestinal: Negative  Endocrine: Negative  Genitourinary: Negative  Musculoskeletal: Negative  Skin: Negative  Allergic/Immunologic: Negative  Neurological: Negative  Hematological: Negative  Psychiatric/Behavioral: Negative  Objective:     Physical Exam   Genitourinary: Vagina normal and uterus normal    Genitourinary Comments: shaatz pessary removed with some mild difficulty, it was cleaned and reinserted easily      No vulvar lesions noted, consistent with history of laser therapy

## 2019-02-19 ENCOUNTER — OFFICE VISIT (OUTPATIENT)
Dept: OBGYN CLINIC | Facility: CLINIC | Age: 84
End: 2019-02-19
Payer: MEDICARE

## 2019-02-19 VITALS
DIASTOLIC BLOOD PRESSURE: 58 MMHG | SYSTOLIC BLOOD PRESSURE: 104 MMHG | HEIGHT: 61 IN | BODY MASS INDEX: 21.71 KG/M2 | WEIGHT: 115 LBS

## 2019-02-19 DIAGNOSIS — Z12.31 ENCOUNTER FOR SCREENING MAMMOGRAM FOR MALIGNANT NEOPLASM OF BREAST: Primary | ICD-10-CM

## 2019-02-19 DIAGNOSIS — N81.4 UTERINE PROLAPSE: ICD-10-CM

## 2019-02-19 PROCEDURE — A4562 PESSARY, NON RUBBER,ANY TYPE: HCPCS | Performed by: OBSTETRICS & GYNECOLOGY

## 2019-02-19 PROCEDURE — 99213 OFFICE O/P EST LOW 20 MIN: CPT | Performed by: OBSTETRICS & GYNECOLOGY

## 2019-02-19 NOTE — PROGRESS NOTES
Assessment/Plan:     Uterine prolapse-pessary check completed but we switched her shaatz pessary to a ring with support  Hopefully the flexibility of the ring with support will be easy year for pessary checks  History of vulvar dysplasia-no lesions noted today    I did notice that she has not had a mammogram, I am not sure if she would like to do this but I did give her a prescription for this and they can discuss this with her son  Diagnoses and all orders for this visit:    Encounter for screening mammogram for malignant neoplasm of breast  -     Mammo screening bilateral w cad; Future          Subjective:     Patient ID: Berna Farias is a 81 y o  female  Patient here within a need from her nursing home for a pessary check  She has no complaints  Her aid informed us that she has been sexually active with another patient  The patient admits to this and states she is not having any difficulty      Review of Systems   Constitutional: Negative  HENT: Negative  Eyes: Negative  Respiratory: Negative  Cardiovascular: Negative  Gastrointestinal: Negative  Endocrine: Negative  Genitourinary: Negative  Musculoskeletal: Negative  Skin: Negative  Allergic/Immunologic: Negative  Neurological: Negative  Hematological: Negative  Psychiatric/Behavioral: Negative  Objective:     Physical Exam   Genitourinary: Vagina normal and uterus normal  No labial fusion  There is no rash, tenderness, lesion or injury on the right labia  There is no rash, tenderness, lesion or injury on the left labia  Cervix exhibits discharge  Cervix exhibits no motion tenderness and no friability  Right adnexum displays no mass, no tenderness and no fullness  Left adnexum displays no mass, no tenderness and no fullness  Genitourinary Comments: 2-3/4 shaatz pessary removed with some difficulty, this was uncomfortable for the patient  No vaginal excoriation, there was some discharge    This was replaced with a 3   Ring with support

## 2019-07-01 ENCOUNTER — OFFICE VISIT (OUTPATIENT)
Dept: OBGYN CLINIC | Facility: CLINIC | Age: 84
End: 2019-07-01
Payer: MEDICARE

## 2019-07-01 VITALS
SYSTOLIC BLOOD PRESSURE: 116 MMHG | HEIGHT: 61 IN | BODY MASS INDEX: 22.36 KG/M2 | DIASTOLIC BLOOD PRESSURE: 64 MMHG | WEIGHT: 118.4 LBS

## 2019-07-01 DIAGNOSIS — N81.4 UTERINE PROLAPSE: Primary | ICD-10-CM

## 2019-07-01 DIAGNOSIS — N81.4 UTEROVAGINAL PROLAPSE: ICD-10-CM

## 2019-07-01 PROCEDURE — 99213 OFFICE O/P EST LOW 20 MIN: CPT | Performed by: OBSTETRICS & GYNECOLOGY

## 2019-07-01 NOTE — PROGRESS NOTES
Assessment/Plan:     Uterine prolapse - return in 3-4 months for pessary check    H/o vulvar dysplasia - no lesions noted on exam     Diagnoses and all orders for this visit:    Uterine prolapse    Uterovaginal prolapse          Subjective:     Patient ID: Bienvenido Yuen is a 81 y o  female  Pt here for pessary check  She has no complaints  An aide is with her  Review of Systems   Constitutional: Negative  HENT: Negative  Eyes: Negative  Respiratory: Negative  Cardiovascular: Negative  Gastrointestinal: Negative  Endocrine: Negative  Genitourinary: Negative  Musculoskeletal: Negative  Skin: Negative  Allergic/Immunologic: Negative  Neurological: Negative  Hematological: Negative  Psychiatric/Behavioral: Negative  Objective:     Physical Exam   Genitourinary: Vagina normal and uterus normal  Right adnexum displays no mass, no tenderness and no fullness  Left adnexum displays no mass, no tenderness and no fullness     Genitourinary Comments: Pessary removed, cleaned and reinserted without difficulty  No vaginal excoriation

## 2019-10-07 ENCOUNTER — OFFICE VISIT (OUTPATIENT)
Dept: OBGYN CLINIC | Facility: CLINIC | Age: 84
End: 2019-10-07
Payer: MEDICARE

## 2019-10-07 VITALS — DIASTOLIC BLOOD PRESSURE: 68 MMHG | SYSTOLIC BLOOD PRESSURE: 102 MMHG

## 2019-10-07 DIAGNOSIS — N81.4 UTERINE PROLAPSE: Primary | ICD-10-CM

## 2019-10-07 PROCEDURE — 99213 OFFICE O/P EST LOW 20 MIN: CPT | Performed by: OBSTETRICS & GYNECOLOGY

## 2019-10-08 NOTE — PROGRESS NOTES
Assessment/Plan:     Uterine prolapse - return in 3-4 months for pessary check    Vulvar dysplasia - no sign of recurrence  There are no diagnoses linked to this encounter  Subjective:     Patient ID: Michael Abernathy is a 81 y o  female  Pt presents with caretaker from her nursing home for a pessary check  She is not having any problems      Review of Systems   Constitutional: Negative  Gastrointestinal: Negative  Genitourinary: Negative  Objective:     Physical Exam   Constitutional: She appears well-developed  Pulmonary/Chest: Right breast exhibits no inverted nipple, no mass, no nipple discharge, no skin change and no tenderness  Left breast exhibits no inverted nipple, no mass, no nipple discharge, no skin change and no tenderness  Breasts are symmetrical    Examined seated and supine   Genitourinary: Vagina normal and uterus normal  No labial fusion  There is no rash, tenderness, lesion or injury on the right labia  There is no rash, tenderness, lesion or injury on the left labia  Cervix exhibits no motion tenderness, no discharge and no friability  Right adnexum displays no mass, no tenderness and no fullness  Left adnexum displays no mass, no tenderness and no fullness  Genitourinary Comments: Ring with support removed, cleaned and reinserted without difficulty  No vaginal excoriation  No sign of vulvar dysplasia   Vitals reviewed

## 2020-01-14 ENCOUNTER — OFFICE VISIT (OUTPATIENT)
Dept: OBGYN CLINIC | Facility: CLINIC | Age: 85
End: 2020-01-14
Payer: MEDICARE

## 2020-01-14 VITALS — SYSTOLIC BLOOD PRESSURE: 128 MMHG | DIASTOLIC BLOOD PRESSURE: 68 MMHG

## 2020-01-14 DIAGNOSIS — N81.4 UTERINE PROLAPSE: Primary | ICD-10-CM

## 2020-01-14 PROCEDURE — 99213 OFFICE O/P EST LOW 20 MIN: CPT | Performed by: OBSTETRICS & GYNECOLOGY

## 2020-01-14 NOTE — PROGRESS NOTES
Assessment/Plan:     Uterine prolapse - she will return in 3-4 months for a pessary check  Diagnoses and all orders for this visit:    Uterine prolapse          Subjective:     Patient ID: Latonya Neil is a 81 y o  female  Patient here with aid for a pessary check  Patient has dementia and is a poor historian  Her H states that she has not noticed any concerns or complaints regarding the patient's pessary  Patient asks several questions, she does not recall why she is here  She also has a history of BEE III  Review of Systems   Constitutional: Negative  Genitourinary: Negative  Objective:     Physical Exam   Genitourinary: There is no rash, tenderness, lesion or injury on the right labia  There is no rash, tenderness, lesion or injury on the left labia  Genitourinary Comments: Ring with support removed, cleaned and reinserted  Speculum exam not performed as patient did not want to move far enough down the exam table    No sign of external lesions

## 2020-05-28 ENCOUNTER — OFFICE VISIT (OUTPATIENT)
Dept: OBGYN CLINIC | Facility: CLINIC | Age: 85
End: 2020-05-28
Payer: MEDICARE

## 2020-05-28 VITALS — DIASTOLIC BLOOD PRESSURE: 86 MMHG | SYSTOLIC BLOOD PRESSURE: 124 MMHG

## 2020-05-28 DIAGNOSIS — N81.4 UTERINE PROLAPSE: Primary | ICD-10-CM

## 2020-05-28 PROCEDURE — 99213 OFFICE O/P EST LOW 20 MIN: CPT | Performed by: OBSTETRICS & GYNECOLOGY

## 2020-09-08 ENCOUNTER — OFFICE VISIT (OUTPATIENT)
Dept: OBGYN CLINIC | Facility: CLINIC | Age: 85
End: 2020-09-08
Payer: MEDICARE

## 2020-09-08 VITALS
WEIGHT: 117 LBS | TEMPERATURE: 96.5 F | BODY MASS INDEX: 22.09 KG/M2 | HEIGHT: 61 IN | SYSTOLIC BLOOD PRESSURE: 116 MMHG | DIASTOLIC BLOOD PRESSURE: 72 MMHG

## 2020-09-08 DIAGNOSIS — N81.4 UTERINE PROLAPSE: Primary | ICD-10-CM

## 2020-09-08 PROCEDURE — 99213 OFFICE O/P EST LOW 20 MIN: CPT | Performed by: OBSTETRICS & GYNECOLOGY

## 2020-09-08 NOTE — PROGRESS NOTES
Assessment/Plan:     Uterine prolapse-pessary check done without difficulty  She will return in 3 months  There are no diagnoses linked to this encounter  Subjective:     Patient ID: Raven Akers is a 81 y o  female  Patient here with an aide for a pessary check  She has dementia and offers no history  Her a does not feel that she has been having any problems  Review of Systems   Constitutional: Negative  Gastrointestinal: Negative  Genitourinary: Negative  Objective:     Physical Exam  Genitourinary:     General: Normal vulva  Comments: Patient is somewhat resistant to exam today however ring with support pessary was removed, cleaned and reinserted  Speculum exam not done due to patient's resistance

## (undated) DEVICE — APPLICATOR 6 IN COTTON UNSTRL

## (undated) DEVICE — 3000CC GUARDIAN II: Brand: GUARDIAN

## (undated) DEVICE — 2000CC GUARDIAN II: Brand: GUARDIAN

## (undated) DEVICE — CONNECTOR SIMS STRL

## (undated) DEVICE — FIBER ROBOTIC LASER BEAMPATH

## (undated) DEVICE — SMOKE EVACUATION TUBING WITH 8 IN INTEGRAL WAND AND SPONGE GUARD: Brand: BUFFALO FILTER

## (undated) DEVICE — TUBING SUCTION 5MM X 12 FT

## (undated) DEVICE — GLOVE INDICATOR PI UNDERGLOVE SZ 8 BLUE

## (undated) DEVICE — GLOVE SRG LF STRL BGL SKNSNS 7.5 PF

## (undated) DEVICE — BETHLEHEM UNIVERSAL MINOR VAG: Brand: CARDINAL HEALTH

## (undated) DEVICE — STERILE 8 INCH PROCTO SWAB: Brand: CARDINAL HEALTH

## (undated) DEVICE — TELFA NON-ADHERENT ABSORBENT DRESSING: Brand: TELFA

## (undated) DEVICE — STRL UNIVERSAL MINOR VAGINAL: Brand: CARDINAL HEALTH

## (undated) DEVICE — CUP MEDICINE 1OZ 5000/CS 50/PLT: Brand: MEDEGEN MEDICAL PRODUCTS, LLC